# Patient Record
Sex: FEMALE | Race: WHITE | NOT HISPANIC OR LATINO | URBAN - METROPOLITAN AREA
[De-identification: names, ages, dates, MRNs, and addresses within clinical notes are randomized per-mention and may not be internally consistent; named-entity substitution may affect disease eponyms.]

---

## 2021-06-04 ENCOUNTER — INPATIENT (INPATIENT)
Facility: HOSPITAL | Age: 26
LOS: 0 days | Discharge: HOME | DRG: 343 | End: 2021-06-04
Attending: SURGERY | Admitting: SURGERY
Payer: COMMERCIAL

## 2021-06-04 VITALS
OXYGEN SATURATION: 97 % | TEMPERATURE: 98 F | RESPIRATION RATE: 18 BRPM | HEART RATE: 92 BPM | DIASTOLIC BLOOD PRESSURE: 64 MMHG | SYSTOLIC BLOOD PRESSURE: 128 MMHG

## 2021-06-04 VITALS
RESPIRATION RATE: 18 BRPM | HEIGHT: 64 IN | SYSTOLIC BLOOD PRESSURE: 124 MMHG | HEART RATE: 98 BPM | WEIGHT: 145.06 LBS | DIASTOLIC BLOOD PRESSURE: 85 MMHG | TEMPERATURE: 99 F

## 2021-06-04 DIAGNOSIS — K35.80 UNSPECIFIED ACUTE APPENDICITIS: ICD-10-CM

## 2021-06-04 LAB
ALBUMIN SERPL ELPH-MCNC: 4.1 G/DL — SIGNIFICANT CHANGE UP (ref 3.3–5)
ALP SERPL-CCNC: 107 U/L — SIGNIFICANT CHANGE UP (ref 40–120)
ALT FLD-CCNC: 16 U/L — SIGNIFICANT CHANGE UP (ref 12–78)
ANION GAP SERPL CALC-SCNC: 8 MMOL/L — SIGNIFICANT CHANGE UP (ref 5–17)
APPEARANCE UR: ABNORMAL
AST SERPL-CCNC: 15 U/L — SIGNIFICANT CHANGE UP (ref 15–37)
BACTERIA # UR AUTO: ABNORMAL
BASOPHILS # BLD AUTO: 0.06 K/UL — SIGNIFICANT CHANGE UP (ref 0–0.2)
BASOPHILS NFR BLD AUTO: 0.4 % — SIGNIFICANT CHANGE UP (ref 0–2)
BILIRUB SERPL-MCNC: 0.5 MG/DL — SIGNIFICANT CHANGE UP (ref 0.2–1.2)
BILIRUB UR-MCNC: NEGATIVE — SIGNIFICANT CHANGE UP
BUN SERPL-MCNC: 10 MG/DL — SIGNIFICANT CHANGE UP (ref 7–23)
CALCIUM SERPL-MCNC: 8.9 MG/DL — SIGNIFICANT CHANGE UP (ref 8.5–10.1)
CHLORIDE SERPL-SCNC: 105 MMOL/L — SIGNIFICANT CHANGE UP (ref 96–108)
CO2 SERPL-SCNC: 24 MMOL/L — SIGNIFICANT CHANGE UP (ref 22–31)
COLOR SPEC: YELLOW — SIGNIFICANT CHANGE UP
CREAT SERPL-MCNC: 0.77 MG/DL — SIGNIFICANT CHANGE UP (ref 0.5–1.3)
DIFF PNL FLD: ABNORMAL
EOSINOPHIL # BLD AUTO: 0.09 K/UL — SIGNIFICANT CHANGE UP (ref 0–0.5)
EOSINOPHIL NFR BLD AUTO: 0.5 % — SIGNIFICANT CHANGE UP (ref 0–6)
EPI CELLS # UR: ABNORMAL
GLUCOSE SERPL-MCNC: 97 MG/DL — SIGNIFICANT CHANGE UP (ref 70–99)
GLUCOSE UR QL: NEGATIVE — SIGNIFICANT CHANGE UP
HCG SERPL-ACNC: <1 MIU/ML — SIGNIFICANT CHANGE UP
HCT VFR BLD CALC: 42.9 % — SIGNIFICANT CHANGE UP (ref 34.5–45)
HGB BLD-MCNC: 14.2 G/DL — SIGNIFICANT CHANGE UP (ref 11.5–15.5)
IMM GRANULOCYTES NFR BLD AUTO: 0.4 % — SIGNIFICANT CHANGE UP (ref 0–1.5)
KETONES UR-MCNC: NEGATIVE — SIGNIFICANT CHANGE UP
LEUKOCYTE ESTERASE UR-ACNC: ABNORMAL
LIDOCAIN IGE QN: 79 U/L — SIGNIFICANT CHANGE UP (ref 73–393)
LYMPHOCYTES # BLD AUTO: 16.2 % — SIGNIFICANT CHANGE UP (ref 13–44)
LYMPHOCYTES # BLD AUTO: 2.66 K/UL — SIGNIFICANT CHANGE UP (ref 1–3.3)
MCHC RBC-ENTMCNC: 27.8 PG — SIGNIFICANT CHANGE UP (ref 27–34)
MCHC RBC-ENTMCNC: 33.1 GM/DL — SIGNIFICANT CHANGE UP (ref 32–36)
MCV RBC AUTO: 84.1 FL — SIGNIFICANT CHANGE UP (ref 80–100)
MONOCYTES # BLD AUTO: 0.83 K/UL — SIGNIFICANT CHANGE UP (ref 0–0.9)
MONOCYTES NFR BLD AUTO: 5.1 % — SIGNIFICANT CHANGE UP (ref 2–14)
NEUTROPHILS # BLD AUTO: 12.7 K/UL — HIGH (ref 1.8–7.4)
NEUTROPHILS NFR BLD AUTO: 77.4 % — HIGH (ref 43–77)
NITRITE UR-MCNC: POSITIVE
NRBC # BLD: 0 /100 WBCS — SIGNIFICANT CHANGE UP (ref 0–0)
PH UR: 6.5 — SIGNIFICANT CHANGE UP (ref 5–8)
PLATELET # BLD AUTO: 346 K/UL — SIGNIFICANT CHANGE UP (ref 150–400)
POTASSIUM SERPL-MCNC: 4 MMOL/L — SIGNIFICANT CHANGE UP (ref 3.5–5.3)
POTASSIUM SERPL-SCNC: 4 MMOL/L — SIGNIFICANT CHANGE UP (ref 3.5–5.3)
PROT SERPL-MCNC: 8.5 G/DL — HIGH (ref 6–8.3)
PROT UR-MCNC: NEGATIVE — SIGNIFICANT CHANGE UP
RBC # BLD: 5.1 M/UL — SIGNIFICANT CHANGE UP (ref 3.8–5.2)
RBC # FLD: 12.1 % — SIGNIFICANT CHANGE UP (ref 10.3–14.5)
RBC CASTS # UR COMP ASSIST: SIGNIFICANT CHANGE UP /HPF (ref 0–4)
SARS-COV-2 RNA SPEC QL NAA+PROBE: SIGNIFICANT CHANGE UP
SODIUM SERPL-SCNC: 137 MMOL/L — SIGNIFICANT CHANGE UP (ref 135–145)
SP GR SPEC: 1.01 — SIGNIFICANT CHANGE UP (ref 1.01–1.02)
UROBILINOGEN FLD QL: NEGATIVE — SIGNIFICANT CHANGE UP
WBC # BLD: 16.41 K/UL — HIGH (ref 3.8–10.5)
WBC # FLD AUTO: 16.41 K/UL — HIGH (ref 3.8–10.5)
WBC UR QL: ABNORMAL

## 2021-06-04 PROCEDURE — 88304 TISSUE EXAM BY PATHOLOGIST: CPT

## 2021-06-04 PROCEDURE — 86901 BLOOD TYPING SEROLOGIC RH(D): CPT

## 2021-06-04 PROCEDURE — 99285 EMERGENCY DEPT VISIT HI MDM: CPT

## 2021-06-04 PROCEDURE — 93005 ELECTROCARDIOGRAM TRACING: CPT

## 2021-06-04 PROCEDURE — 96365 THER/PROPH/DIAG IV INF INIT: CPT

## 2021-06-04 PROCEDURE — 86850 RBC ANTIBODY SCREEN: CPT

## 2021-06-04 PROCEDURE — 81001 URINALYSIS AUTO W/SCOPE: CPT

## 2021-06-04 PROCEDURE — 80053 COMPREHEN METABOLIC PANEL: CPT

## 2021-06-04 PROCEDURE — 36415 COLL VENOUS BLD VENIPUNCTURE: CPT

## 2021-06-04 PROCEDURE — 85025 COMPLETE CBC W/AUTO DIFF WBC: CPT

## 2021-06-04 PROCEDURE — 86900 BLOOD TYPING SEROLOGIC ABO: CPT

## 2021-06-04 PROCEDURE — 74177 CT ABD & PELVIS W/CONTRAST: CPT

## 2021-06-04 PROCEDURE — 84702 CHORIONIC GONADOTROPIN TEST: CPT

## 2021-06-04 PROCEDURE — 83690 ASSAY OF LIPASE: CPT

## 2021-06-04 PROCEDURE — 44970 LAPAROSCOPY APPENDECTOMY: CPT | Mod: AS

## 2021-06-04 PROCEDURE — 93010 ELECTROCARDIOGRAM REPORT: CPT

## 2021-06-04 PROCEDURE — 88304 TISSUE EXAM BY PATHOLOGIST: CPT | Mod: 26

## 2021-06-04 PROCEDURE — C1889: CPT

## 2021-06-04 PROCEDURE — 74177 CT ABD & PELVIS W/CONTRAST: CPT | Mod: 26,ME

## 2021-06-04 PROCEDURE — 96375 TX/PRO/DX INJ NEW DRUG ADDON: CPT

## 2021-06-04 PROCEDURE — 87635 SARS-COV-2 COVID-19 AMP PRB: CPT

## 2021-06-04 PROCEDURE — G1004: CPT

## 2021-06-04 RX ORDER — OXYCODONE AND ACETAMINOPHEN 5; 325 MG/1; MG/1
1 TABLET ORAL
Qty: 20 | Refills: 0
Start: 2021-06-04

## 2021-06-04 RX ORDER — SODIUM CHLORIDE 9 MG/ML
1000 INJECTION INTRAMUSCULAR; INTRAVENOUS; SUBCUTANEOUS ONCE
Refills: 0 | Status: COMPLETED | OUTPATIENT
Start: 2021-06-04 | End: 2021-06-04

## 2021-06-04 RX ORDER — OXYCODONE HYDROCHLORIDE 5 MG/1
5 TABLET ORAL ONCE
Refills: 0 | Status: DISCONTINUED | OUTPATIENT
Start: 2021-06-04 | End: 2021-06-04

## 2021-06-04 RX ORDER — SODIUM CHLORIDE 9 MG/ML
1000 INJECTION, SOLUTION INTRAVENOUS
Refills: 0 | Status: DISCONTINUED | OUTPATIENT
Start: 2021-06-04 | End: 2021-06-04

## 2021-06-04 RX ORDER — SODIUM CHLORIDE 9 MG/ML
1000 INJECTION INTRAMUSCULAR; INTRAVENOUS; SUBCUTANEOUS
Refills: 0 | Status: DISCONTINUED | OUTPATIENT
Start: 2021-06-04 | End: 2021-06-04

## 2021-06-04 RX ORDER — MORPHINE SULFATE 50 MG/1
4 CAPSULE, EXTENDED RELEASE ORAL ONCE
Refills: 0 | Status: DISCONTINUED | OUTPATIENT
Start: 2021-06-04 | End: 2021-06-04

## 2021-06-04 RX ORDER — ONDANSETRON 8 MG/1
4 TABLET, FILM COATED ORAL ONCE
Refills: 0 | Status: COMPLETED | OUTPATIENT
Start: 2021-06-04 | End: 2021-06-04

## 2021-06-04 RX ORDER — ONDANSETRON 8 MG/1
4 TABLET, FILM COATED ORAL ONCE
Refills: 0 | Status: DISCONTINUED | OUTPATIENT
Start: 2021-06-04 | End: 2021-06-04

## 2021-06-04 RX ORDER — PIPERACILLIN AND TAZOBACTAM 4; .5 G/20ML; G/20ML
3.38 INJECTION, POWDER, LYOPHILIZED, FOR SOLUTION INTRAVENOUS ONCE
Refills: 0 | Status: COMPLETED | OUTPATIENT
Start: 2021-06-04 | End: 2021-06-04

## 2021-06-04 RX ORDER — HYDROMORPHONE HYDROCHLORIDE 2 MG/ML
0.5 INJECTION INTRAMUSCULAR; INTRAVENOUS; SUBCUTANEOUS
Refills: 0 | Status: DISCONTINUED | OUTPATIENT
Start: 2021-06-04 | End: 2021-06-04

## 2021-06-04 RX ADMIN — MORPHINE SULFATE 4 MILLIGRAM(S): 50 CAPSULE, EXTENDED RELEASE ORAL at 12:56

## 2021-06-04 RX ADMIN — PIPERACILLIN AND TAZOBACTAM 200 GRAM(S): 4; .5 INJECTION, POWDER, LYOPHILIZED, FOR SOLUTION INTRAVENOUS at 14:31

## 2021-06-04 RX ADMIN — SODIUM CHLORIDE 1000 MILLILITER(S): 9 INJECTION INTRAMUSCULAR; INTRAVENOUS; SUBCUTANEOUS at 14:31

## 2021-06-04 RX ADMIN — PIPERACILLIN AND TAZOBACTAM 3.38 GRAM(S): 4; .5 INJECTION, POWDER, LYOPHILIZED, FOR SOLUTION INTRAVENOUS at 15:13

## 2021-06-04 RX ADMIN — SODIUM CHLORIDE 1000 MILLILITER(S): 9 INJECTION INTRAMUSCULAR; INTRAVENOUS; SUBCUTANEOUS at 11:53

## 2021-06-04 RX ADMIN — MORPHINE SULFATE 4 MILLIGRAM(S): 50 CAPSULE, EXTENDED RELEASE ORAL at 12:41

## 2021-06-04 RX ADMIN — ONDANSETRON 4 MILLIGRAM(S): 8 TABLET, FILM COATED ORAL at 11:53

## 2021-06-04 NOTE — H&P ADULT - HISTORY OF PRESENT ILLNESS
This is a pleasant, 26-yo female with no significant past medical or surgical history, who presents today with RLQ abdominal pain, anorexia, and nausea/vomiting that began yesterday evening.  Patient was in her [facility], until symptoms began suddenly last night - initially attributed to menstrual cramping.  Pain worsened throughout the night into this morning, and was unable to sleep due to pain.   Has had associated nausea, with 2 episodes of vomiting as well.  Denies fevers/chills.  Denies any similar prior episodes.  Last PO intake was a sip of water early this morning.  Last BM 2 days ago - reports normal color and caliber.

## 2021-06-04 NOTE — ASU DISCHARGE PLAN (ADULT/PEDIATRIC) - SIGNS AND SYMPTOMS OF INFECTION: FEVER, REDNESS, SWELLING, FOUL SMELLING DISCHARGE
Statement Selected Alert and oriented x 3, normal mood and affect, no apparent risk to self or others.

## 2021-06-04 NOTE — BRIEF OPERATIVE NOTE - NSICDXBRIEFPREOP_GEN_ALL_CORE_FT
PRE-OP DIAGNOSIS:  Acute appendicitis with localized peritonitis 04-Jun-2021 19:51:42  Frantz Chadwick

## 2021-06-04 NOTE — BRIEF OPERATIVE NOTE - NSICDXBRIEFPOSTOP_GEN_ALL_CORE_FT
POST-OP DIAGNOSIS:  Acute appendicitis with localized peritonitis 04-Jun-2021 19:51:47  Frantz Chadwick

## 2021-06-04 NOTE — H&P ADULT - NSHPLABSRESULTS_GEN_ALL_CORE
14.2   16.41 )-----------( 346      ( 04 Jun 2021 11:58 )             42.9   06-04    137  |  105  |  10  ----------------------------<  97  4.0   |  24  |  0.77    Ca    8.9      04 Jun 2021 11:58    TPro  8.5<H>  /  Alb  4.1  /  TBili  0.5  /  DBili  x   /  AST  15  /  ALT  16  /  AlkPhos  107  06-04      EXAM:  CT ABDOMEN AND PELVIS IC                        PROCEDURE DATE:  06/04/2021    INTERPRETATION:  CLINICAL INFORMATION: Right lower quadrant pain suspicion for appendicitis  COMPARISON: None.  CONTRAST/COMPLICATIONS:  IV Contrast: Omnipaque 350  90 cc administered   10 cc discarded  Oral Contrast: NONE  Complications: None reported at time of study completion  PROCEDURE:  CT of the Abdomen and Pelvis was performed.  Sagittal and coronal reformats were performed.  FINDINGS:  LOWER CHEST: Within normal limits.  LIVER: Within normal limits.  BILE DUCTS: Normal caliber.  GALLBLADDER: Within normal limits.  SPLEEN: Within normal limits.  PANCREAS: Within normal limits.  ADRENALS: Within normal limits.  KIDNEYS/URETERS: Within normal limits.  BLADDER: Within normal limits.  REPRODUCTIVE ORGANS: Unremarkable  BOWEL: No bowel obstruction. Appendix thickened with appendicolith periappendiceal infiltration consistent with acute appendicitis  PERITONEUM: No ascites, extraluminal gas or drainable collection.  VESSELS: Within normal limits.  RETROPERITONEUM/LYMPH NODES: No lymphadenopathy.  ABDOMINAL WALL: Within normal limits.  BONES: Within normal limits.  IMPRESSION:  Acute appendicitis  JUAN AARON MD; Attending Radiologist  This document has been electronically signed. Jun 4 2021  1:57PM

## 2021-06-04 NOTE — H&P ADULT - PROBLEM SELECTOR PLAN 1
PLAN:  Admit to Dr. Chadwick's service/Surgery  NPO, IVF, Zosyn (to continue post-op)  For OR today for appendectomy  Patient seen and examined with Dr. Chadwick  Will follow

## 2021-06-04 NOTE — ASU DISCHARGE PLAN (ADULT/PEDIATRIC) - CARE PROVIDER_API CALL
Frantz Chadwick (MD)  Surgery  [address], [address]  [place] [address]  Phone: [phone]  Fax: [phone]  Follow Up Time:

## 2021-06-04 NOTE — ED PROVIDER NOTE - CLINICAL SUMMARY MEDICAL DECISION MAKING FREE TEXT BOX
27 yo female with no significant pmh presents to the ED c/o right sided mid to lower abd pain since last night. Pain is constant, crampy, nonradiating. Associated with nausea and decreased appetite. no vomiting or diarrhea. no h/o abd surgery. Denies fever, chills, chest pain, sob, vomiting, diarrhea,  urinary sxs, flank pain. no h/o abd surgery. PE: as above. A/P: labs, ua/cx, CT abd/pelvis 25 yo female with no significant pmh presents to the ED c/o right sided mid to lower abd pain since last night. Pain is constant, crampy, nonradiating. Associated with nausea and decreased appetite. no vomiting or diarrhea. no h/o abd surgery. Denies fever, chills, chest pain, sob, vomiting, diarrhea,  urinary sxs, flank pain. no h/o abd surgery. PE: as above. A/P: labs, ua/cx, CT abd/pelvis. + wbc 16, + nitrites/leuks, + acute appy. Case discussed with surg, abx given, will admit to Dr. Chadwick for OR.

## 2021-06-04 NOTE — ED PROVIDER NOTE - OBJECTIVE STATEMENT
25 yo female with no significant pmh presents to the ED c/o right sided mid to lower abd pain since last night. Pain is constant, crampy, nonradiating. Associated with nausea and decreased appetite. no vomiting or diarrhea. no h/o abd surgery. Denies fever, chills, chest pain, sob, vomiting, diarrhea,  urinary sxs, flank pain. no h/o abd surgery.

## 2021-06-04 NOTE — ED PROVIDER NOTE - ATTENDING CONTRIBUTION TO CARE
27 yo f who denies any sig pmhx psxh lmp 1 week ago developed rlq pain since last jose antonio non radiating feels just as bad as her childbirth. no hx of appy no hx of renal stones or ovarian disease no vag bleeding or discharge denies current pregnancy no fever or chills pain with ambulation and sitting up   no urinesx  on eval  wd wn female appears comfortable not toxic  heent nc at mmm perrla an ictric sclera  cor rrr  chest cta  abd soft but r periumbilical discomfort to palpation no guard no rebound no pelvic pain neg keating ++r cvat pt has pain on psoas as the hip flex causes pain in abd  ext normal  skin normal  neuro normal  plan ua labs ct pain mgt ro stone ro appy ro other causes of abd pain in female pt

## 2021-06-04 NOTE — ED PROVIDER NOTE - ABDOMINAL TENDER
+ right mid to right lower abd tenderness. no rebound or guarding. no CVA tenderness./right lower quadrant

## 2021-06-04 NOTE — H&P ADULT - NSHPSOCIALHISTORY_GEN_ALL_CORE
Single, denies cigarette smoking, + vapes, occasional ETOH use, denies illicit drug use.  Works as  at [employer].

## 2021-06-04 NOTE — ED ADULT NURSE NOTE - OBJECTIVE STATEMENT
Pt. received alert and oriented x4 with chief complaint of right RLQ abdominal pain for 1 day w/ nausea and tender to touch. Pt. denies any other symptoms.

## 2021-06-04 NOTE — H&P ADULT - ASSESSMENT
26-yo female with no significant med/sx history presents with RLQ pain, n/v  Labwork with leukocytosis  CT confirms acute appendicitis

## 2022-10-11 NOTE — PATIENT PROFILE ADULT - DOES PATIENT HAVE ADVANCE DIRECTIVE
Pt was seen bt Dr Abdi for Advanced Heart Failure Options.  Referral was closed by Dr Abdi for drug use.     No